# Patient Record
(demographics unavailable — no encounter records)

---

## 2025-03-10 NOTE — DISCUSSION/SUMMARY
[de-identified] :  Reviewed all X-ray images with patient, interpretation was provided. Discussed all treatment options with patient, would like to follow through with csi inj today Physical therapy prescribed for strengthening and stretching. Patient will follow up in 6 weeks.   RB&A to corticosteroid injection discussed. All questions were answered. Patient wishes to move forward with injection today.   Right Shoulder Subacromial steroid injection - ULTRASOUND GUIDED  Patient Identification Name/: Verbal with patient and/or family   Procedure Verification: Procedure confirmed with patient or family/designee Consent for procedure: Verbal Consent Given Relevant documentation completed, reviewed, and signed Clinical indications for procedure confirmed  Time-out with all members of procedure team immediately prior to procedure: Correct patient identified. Agreement on procedure. Correct side and site.  SHOULDER SUBACROMIAL INJECTION - RIGHT After verbal consent and identification of the correct patient and correct site, the RIGHT posterolateral shoulder was prepped using alcohol swabs and betadine. This was allowed time to air dry. A mixture of Kenalog, Bupivacaine was injected into the right shoulder subacromial space using a sterile 22G needle after ethyl chloride spray for skin anesthesia. The patient tolerated the procedure well.  A sterile dressing was placed.  After-care instructions were provided and included instructions to ice the area and to call if redness        ----------------------------------------------- Home Exercise The patient is instructed on a home exercise program.  RICHARD LIN Acting as a Scribe for Dr. Enrike ARIAS, Richard Lin, attest that this documentation has been prepared under the direction and in the presence of Provider Evgeny Calvert MD.  Activity Modification The patient was advised to modify their activities.  Dx / Natural History The patient was advised of the diagnosis.  The natural history of the pathology was explained in full to the patient in layman's terms.  Several different treatment options were discussed and explained in full to the patient including the risks and benefits of both surgical and non-surgical treatments.  All questions and concerns were answered.  Pain Guide Activities The patient was advised to let pain guide the gradual advancement of activities.  MEGA ARIAS explained to the patient that rest, ice, compression, and elevation would benefit them.  They may return to activity after follow-up or when they no longer have any pain.  The patient's current medication management of their orthopedic diagnosis was reviewed today: (1) We discussed a comprehensive treatment plan that included possible pharmaceutical management involving the use of prescription strength medications including but not limited to options such as oral Naprosyn 500mg BID, once daily Meloxicam 15 mg, or 500-650 mg Tylenol versus over the counter oral medications and topical prescription NSAID Pennsaid vs over the counter Voltaren gel. (2) There is a moderate risk of morbidity with further treatment, especially from use of prescription strength medications and possible side effects of these medications which include upset stomach with oral medications, skin reactions to topical medications and cardiac/renal issues with long term use. (3) I recommended that the patient follow-up with their medical physician to discuss any significant specific potential issues with long term medication use such as interactions with current medications or with exacerbation of underlying medical comorbidities. (4) The benefits and risks associated with use of injectable, oral or topical, prescription and over the counter anti-inflammatory medications were discussed with the patient. The patient voiced understanding of the risks including but not limited to bleeding, stroke, kidney dysfunction, heart disease, and were referred to the black box warning label for further information.

## 2025-03-10 NOTE — PHYSICAL EXAM
[de-identified] : Neurovascularly intact distally   Right Elbow: distal biceps tenderness  - hook test  no effusion NVI  Right Shoulder: +Impingement test +Neer test +Alexandra test +Wilmer sign 4-/5 Supraspinatus Strength

## 2025-03-10 NOTE — DATA REVIEWED
[FreeTextEntry1] : 03/07/25 [facility] X-ray right elbow 3 view: Unremarkable  03/07/25 [facility] X-Ray Examination of the RIGHT SHOULDER: 3+ views: Unremarkable

## 2025-03-10 NOTE — HISTORY OF PRESENT ILLNESS
[de-identified] : The patient is a 49 year old [RIGHT] hand dominant male who presents today complaining of RT bicep.   Date of Injury/Onset:  5/11/24 Pain:    At Rest: 5/10 in the morning  With Activity:  8/10  Mechanism of injury: pt was lifting a flower pot and states it dropped as he went to catch it he felt a sharp pain to the arm This is NOT a Work Related Injury being treated under Worker's Compensation. This is NOT an athletic injury occurring associated with an interscholastic or organized sports team. Quality of symptoms: aching, sharp, soreness  Improves with: Aleve  Worse with: across the body movements, reaching  Treatment/Imaging since last visit: PT  Out of work/sport: _, since _ School/Sport/Position/Occupation: [employer]  Changes since last visit: Patient reports around 11/24/24 he was trying to start a  and believes he pulled something in his right arm. Patient states certain movements trigger a sharp pain in different areas of his right upper arm.  Additional Information: None

## 2025-03-10 NOTE — PHYSICAL EXAM
[de-identified] : Neurovascularly intact distally   Right Elbow: distal biceps tenderness  - hook test  no effusion NVI  Right Shoulder: +Impingement test +Neer test +Alexandra test +Wilmer sign 4-/5 Supraspinatus Strength

## 2025-03-10 NOTE — HISTORY OF PRESENT ILLNESS
[de-identified] : The patient is a 49 year old [RIGHT] hand dominant male who presents today complaining of RT bicep.   Date of Injury/Onset:  5/11/24 Pain:    At Rest: 5/10 in the morning  With Activity:  8/10  Mechanism of injury: pt was lifting a flower pot and states it dropped as he went to catch it he felt a sharp pain to the arm This is NOT a Work Related Injury being treated under Worker's Compensation. This is NOT an athletic injury occurring associated with an interscholastic or organized sports team. Quality of symptoms: aching, sharp, soreness  Improves with: Aleve  Worse with: across the body movements, reaching  Treatment/Imaging since last visit: PT  Out of work/sport: _, since _ School/Sport/Position/Occupation: [employer]  Changes since last visit: Patient reports around 11/24/24 he was trying to start a  and believes he pulled something in his right arm. Patient states certain movements trigger a sharp pain in different areas of his right upper arm.  Additional Information: None

## 2025-03-10 NOTE — DISCUSSION/SUMMARY
[de-identified] :  Reviewed all X-ray images with patient, interpretation was provided. Discussed all treatment options with patient, would like to follow through with csi inj today Physical therapy prescribed for strengthening and stretching. Patient will follow up in 6 weeks.   RB&A to corticosteroid injection discussed. All questions were answered. Patient wishes to move forward with injection today.   Right Shoulder Subacromial steroid injection - ULTRASOUND GUIDED  Patient Identification Name/: Verbal with patient and/or family   Procedure Verification: Procedure confirmed with patient or family/designee Consent for procedure: Verbal Consent Given Relevant documentation completed, reviewed, and signed Clinical indications for procedure confirmed  Time-out with all members of procedure team immediately prior to procedure: Correct patient identified. Agreement on procedure. Correct side and site.  SHOULDER SUBACROMIAL INJECTION - RIGHT After verbal consent and identification of the correct patient and correct site, the RIGHT posterolateral shoulder was prepped using alcohol swabs and betadine. This was allowed time to air dry. A mixture of Kenalog, Bupivacaine was injected into the right shoulder subacromial space using a sterile 22G needle after ethyl chloride spray for skin anesthesia. The patient tolerated the procedure well.  A sterile dressing was placed.  After-care instructions were provided and included instructions to ice the area and to call if redness        ----------------------------------------------- Home Exercise The patient is instructed on a home exercise program.  RICHARD LIN Acting as a Scribe for Dr. Enrike ARIAS, Richard Lin, attest that this documentation has been prepared under the direction and in the presence of Provider Evgeny Calvert MD.  Activity Modification The patient was advised to modify their activities.  Dx / Natural History The patient was advised of the diagnosis.  The natural history of the pathology was explained in full to the patient in layman's terms.  Several different treatment options were discussed and explained in full to the patient including the risks and benefits of both surgical and non-surgical treatments.  All questions and concerns were answered.  Pain Guide Activities The patient was advised to let pain guide the gradual advancement of activities.  MEGA ARIAS explained to the patient that rest, ice, compression, and elevation would benefit them.  They may return to activity after follow-up or when they no longer have any pain.  The patient's current medication management of their orthopedic diagnosis was reviewed today: (1) We discussed a comprehensive treatment plan that included possible pharmaceutical management involving the use of prescription strength medications including but not limited to options such as oral Naprosyn 500mg BID, once daily Meloxicam 15 mg, or 500-650 mg Tylenol versus over the counter oral medications and topical prescription NSAID Pennsaid vs over the counter Voltaren gel. (2) There is a moderate risk of morbidity with further treatment, especially from use of prescription strength medications and possible side effects of these medications which include upset stomach with oral medications, skin reactions to topical medications and cardiac/renal issues with long term use. (3) I recommended that the patient follow-up with their medical physician to discuss any significant specific potential issues with long term medication use such as interactions with current medications or with exacerbation of underlying medical comorbidities. (4) The benefits and risks associated with use of injectable, oral or topical, prescription and over the counter anti-inflammatory medications were discussed with the patient. The patient voiced understanding of the risks including but not limited to bleeding, stroke, kidney dysfunction, heart disease, and were referred to the black box warning label for further information.

## 2025-04-15 NOTE — PHYSICAL EXAM
[de-identified] : Neurovascularly intact distally   Right Elbow: nontender full rom - hook test  no effusion NVI  Right Shoulder: +Impingement test +Neer test +Alexandra test +Wilmer sign 4-/5 Supraspinatus Strength

## 2025-04-15 NOTE — DISCUSSION/SUMMARY
[de-identified] : Patient was given prescription of formal physical therapy for strengthening and stretching. Rx Naprosyn sent to patient pharmacy. Patient will follow up in 6 weeks.     ----------------------------------------------- Home Exercise The patient is instructed on a home exercise program.   ERNESTINA BALES Acting as a Scribe for Dr. Enrike ARIAS, Ernestina Bales, attest that this documentation has been prepared under the direction and in the presence of Provider Dr. Calvert.   Activity Modification The patient was advised to modify their activities.   Dx / Natural History The patient was advised of the diagnosis. The natural history of the pathology was explained in full to the patient in layman's terms. Several different treatment options were discussed and explained in full to the patient including the risks and benefits of both surgical and non-surgical treatments.  All questions and concerns were answered.   Pain Guide Activities The patient was advised to let pain guide the gradual advancement of activities.   RICE I explained to the patient that rest, ice, compression, and elevation would benefit them. They may return to activity after follow-up or when they no longer have any pain.   The patient's current medication management of their orthopedic diagnosis was reviewed today: (1) We discussed a comprehensive treatment plan that included possible pharmaceutical management involving the use of prescription strength medications including but not limited to options such as oral Naprosyn 500mg BID, once daily Meloxicam 15 mg, or 500-650 mg Tylenol versus over the counter oral medications and topical prescription NSAID Pennsaid vs over the counter Voltaren gel. (2) There is a moderate risk of morbidity with further treatment, especially from use of prescription strength medications and possible side effects of these medications which include upset stomach with oral medications, skin reactions to topical medications and cardiac/renal issues with long term use. (3) I recommended that the patient follow-up with their medical physician to discuss any significant specific potential issues with long term medication use such as interactions with current medications or with exacerbation of underlying medical comorbidities. (4) The benefits and risks associated with use of injectable, oral or topical, prescription and over the counter anti-inflammatory medications were discussed with the patient. The patient voiced understanding of the risks including but not limited to bleeding, stroke, kidney dysfunction, heart disease, and were referred to the black box warning label for further information.

## 2025-04-15 NOTE — HISTORY OF PRESENT ILLNESS
[de-identified] : The patient is a 49 year old [RIGHT] hand dominant male who presents today complaining of RT bicep.   Date of Injury/Onset:  5/11/24 Pain:    At Rest: 2/10 With Activity:  5/10  Mechanism of injury: pt was lifting a flower pot and states it dropped as he went to catch it he felt a sharp pain to the arm This is NOT a Work Related Injury being treated under Worker's Compensation. This is NOT an athletic injury occurring associated with an interscholastic or organized sports team. Quality of symptoms: aching, sharp, soreness  Improves with: Aleve  Worse with: across the body movements, reaching  Treatment/Imaging since last visit: CSI RT SHOULDER  3/7/25 and PT for RT elbow @ [facility]x per week w/ HEP Out of work/sport: currently working  School/Sport/Position/Occupation: [employer]  Changes since last visit:  Patient reports dec in elbow pain since beginning PT. Reports mild dec in pain of RT shoulder but reports dull constant ache that is aggravated by overhead or reaching backward activity. May benefit from PT of the shoulder Additional Information: None

## 2025-04-15 NOTE — ADDENDUM
[FreeTextEntry1] : Documented by Kofi Mondragon acting as a scribe for Dr. Calvert and Zeferino Bazzi PA-C on 04/15/2025 and was presence for the following sections: Physical Exam; Data Reviewed; Assessment; Discussion/Summary. All medical record entries made by the Scribe were at my, Dr. Calvert, and Zeferino Bazzi, direction and personally dictated by me on 04/15/2025. I have reviewed the chart and agree that the record accurately reflects my personal performance of the history, physical exam, procedure and imaging.

## 2025-06-01 NOTE — DISCUSSION/SUMMARY
[de-identified] : Patient is still experiencing pain during today's visit. ZP MRI of the right shoulder to eval for RCT Physical therapy prescribed for strengthening and stretching. Follow up after scan.     **GIRD   RB&A to corticosteroid injection discussed. All questions were answered. Patient wishes to move forward with injection today.   Right Shoulder Subacromial steroid injection - ULTRASOUND GUIDED  Patient Identification Name/: Verbal with patient and/or family   Procedure Verification: Procedure confirmed with patient or family/designee Consent for procedure: Verbal Consent Given Relevant documentation completed, reviewed, and signed Clinical indications for procedure confirmed  Time-out with all members of procedure team immediately prior to procedure: Correct patient identified. Agreement on procedure. Correct side and site.  SHOULDER SUBACROMIAL INJECTION - RIGHT After verbal consent and identification of the correct patient and correct site, the RIGHT posterolateral shoulder was prepped using alcohol swabs and betadine. This was allowed time to air dry. A mixture of Kenalog, Bupivacaine was injected into the right shoulder subacromial space using a sterile 22G needle after ethyl chloride spray for skin anesthesia. The patient tolerated the procedure well.  A sterile dressing was placed.  After-care instructions were provided and included instructions to ice the area and to call if redness  ----------------------------------------------- Home Exercise The patient is instructed on a home exercise program.  RICHARD LIN Acting as a Scribe for Dr. Enrike ARIAS, Richard Lin, attest that this documentation has been prepared under the direction and in the presence of Provider Evgeny Calvert MD.  Activity Modification The patient was advised to modify their activities.  Dx / Natural History The patient was advised of the diagnosis.  The natural history of the pathology was explained in full to the patient in layman's terms.  Several different treatment options were discussed and explained in full to the patient including the risks and benefits of both surgical and non-surgical treatments.  All questions and concerns were answered.  Pain Guide Activities The patient was advised to let pain guide the gradual advancement of activities.  MEGA ARIAS explained to the patient that rest, ice, compression, and elevation would benefit them.  They may return to activity after follow-up or when they no longer have any pain.  The patient's current medication management of their orthopedic diagnosis was reviewed today: (1) We discussed a comprehensive treatment plan that included possible pharmaceutical management involving the use of prescription strength medications including but not limited to options such as oral Naprosyn 500mg BID, once daily Meloxicam 15 mg, or 500-650 mg Tylenol versus over the counter oral medications and topical prescription NSAID Pennsaid vs over the counter Voltaren gel. (2) There is a moderate risk of morbidity with further treatment, especially from use of prescription strength medications and possible side effects of these medications which include upset stomach with oral medications, skin reactions to topical medications and cardiac/renal issues with long term use. (3) I recommended that the patient follow-up with their medical physician to discuss any significant specific potential issues with long term medication use such as interactions with current medications or with exacerbation of underlying medical comorbidities. (4) The benefits and risks associated with use of injectable, oral or topical, prescription and over the counter anti-inflammatory medications were discussed with the patient. The patient voiced understanding of the risks including but not limited to bleeding, stroke, kidney dysfunction, heart disease, and were referred to the black box warning label for further information.

## 2025-06-01 NOTE — PHYSICAL EXAM
[de-identified] : Neurovascularly intact distally   Right Elbow: nontender full rom - hook test  no effusion NVI  Right Shoulder: +Impingement test +Neer test +Alexandra test +Wilmer sign 4-/5 Supraspinatus Strength scapular stabilized internal rotation: 15 degrees

## 2025-06-01 NOTE — HISTORY OF PRESENT ILLNESS
[de-identified] : The patient is a 49 year old [RIGHT] hand dominant male who presents today complaining of RT bicep.   Date of Injury/Onset:  5/11/24 Pain:    At Rest: 2/10 With Activity:  5/10  Mechanism of injury: pt was lifting a flower pot and states it dropped as he went to catch it he felt a sharp pain to the arm This is NOT a Work Related Injury being treated under Worker's Compensation. This is NOT an athletic injury occurring associated with an interscholastic or organized sports team. Quality of symptoms: aching, sharp, soreness  Improves with: Aleve  Worse with: across the body movements, reaching  Treatment/Imaging since last visit: CSI RT SHOULDER  3/7/25 and PT @ [facility] per week w/ HEP Out of work/sport: currently working  School/Sport/Position/Occupation: [employer]  Changes since last visit:  Patient reports continued pain with RT shoulder. Reports onset of pain after inc activity that lasts for 2-3 days until it resolves. Additional Information: None

## 2025-06-01 NOTE — HISTORY OF PRESENT ILLNESS
[de-identified] : The patient is a 49 year old [RIGHT] hand dominant male who presents today complaining of RT bicep.   Date of Injury/Onset:  5/11/24 Pain:    At Rest: 2/10 With Activity:  5/10  Mechanism of injury: pt was lifting a flower pot and states it dropped as he went to catch it he felt a sharp pain to the arm This is NOT a Work Related Injury being treated under Worker's Compensation. This is NOT an athletic injury occurring associated with an interscholastic or organized sports team. Quality of symptoms: aching, sharp, soreness  Improves with: Aleve  Worse with: across the body movements, reaching  Treatment/Imaging since last visit: CSI RT SHOULDER  3/7/25 and PT @ [facility] per week w/ HEP Out of work/sport: currently working  School/Sport/Position/Occupation: [employer]  Changes since last visit:  Patient reports continued pain with RT shoulder. Reports onset of pain after inc activity that lasts for 2-3 days until it resolves. Additional Information: None

## 2025-06-01 NOTE — PHYSICAL EXAM
[de-identified] : Neurovascularly intact distally   Right Elbow: nontender full rom - hook test  no effusion NVI  Right Shoulder: +Impingement test +Neer test +Alexandra test +Wilmer sign 4-/5 Supraspinatus Strength scapular stabilized internal rotation: 15 degrees

## 2025-06-01 NOTE — DISCUSSION/SUMMARY
[de-identified] : Patient is still experiencing pain during today's visit. ZP MRI of the right shoulder to eval for RCT Physical therapy prescribed for strengthening and stretching. Follow up after scan.     **GIRD   RB&A to corticosteroid injection discussed. All questions were answered. Patient wishes to move forward with injection today.   Right Shoulder Subacromial steroid injection - ULTRASOUND GUIDED  Patient Identification Name/: Verbal with patient and/or family   Procedure Verification: Procedure confirmed with patient or family/designee Consent for procedure: Verbal Consent Given Relevant documentation completed, reviewed, and signed Clinical indications for procedure confirmed  Time-out with all members of procedure team immediately prior to procedure: Correct patient identified. Agreement on procedure. Correct side and site.  SHOULDER SUBACROMIAL INJECTION - RIGHT After verbal consent and identification of the correct patient and correct site, the RIGHT posterolateral shoulder was prepped using alcohol swabs and betadine. This was allowed time to air dry. A mixture of Kenalog, Bupivacaine was injected into the right shoulder subacromial space using a sterile 22G needle after ethyl chloride spray for skin anesthesia. The patient tolerated the procedure well.  A sterile dressing was placed.  After-care instructions were provided and included instructions to ice the area and to call if redness  ----------------------------------------------- Home Exercise The patient is instructed on a home exercise program.  RICHARD LIN Acting as a Scribe for Dr. Enrike ARIAS, Richard Lin, attest that this documentation has been prepared under the direction and in the presence of Provider Evgeny Calvert MD.  Activity Modification The patient was advised to modify their activities.  Dx / Natural History The patient was advised of the diagnosis.  The natural history of the pathology was explained in full to the patient in layman's terms.  Several different treatment options were discussed and explained in full to the patient including the risks and benefits of both surgical and non-surgical treatments.  All questions and concerns were answered.  Pain Guide Activities The patient was advised to let pain guide the gradual advancement of activities.  MEGA ARIAS explained to the patient that rest, ice, compression, and elevation would benefit them.  They may return to activity after follow-up or when they no longer have any pain.  The patient's current medication management of their orthopedic diagnosis was reviewed today: (1) We discussed a comprehensive treatment plan that included possible pharmaceutical management involving the use of prescription strength medications including but not limited to options such as oral Naprosyn 500mg BID, once daily Meloxicam 15 mg, or 500-650 mg Tylenol versus over the counter oral medications and topical prescription NSAID Pennsaid vs over the counter Voltaren gel. (2) There is a moderate risk of morbidity with further treatment, especially from use of prescription strength medications and possible side effects of these medications which include upset stomach with oral medications, skin reactions to topical medications and cardiac/renal issues with long term use. (3) I recommended that the patient follow-up with their medical physician to discuss any significant specific potential issues with long term medication use such as interactions with current medications or with exacerbation of underlying medical comorbidities. (4) The benefits and risks associated with use of injectable, oral or topical, prescription and over the counter anti-inflammatory medications were discussed with the patient. The patient voiced understanding of the risks including but not limited to bleeding, stroke, kidney dysfunction, heart disease, and were referred to the black box warning label for further information.

## 2025-06-01 NOTE — PHYSICAL EXAM
[de-identified] : Neurovascularly intact distally   Right Elbow: nontender full rom - hook test  no effusion NVI  Right Shoulder: +Impingement test +Neer test +Alexandra test +Wilmer sign 4-/5 Supraspinatus Strength scapular stabilized internal rotation: 15 degrees

## 2025-06-01 NOTE — DISCUSSION/SUMMARY
[de-identified] : Patient is still experiencing pain during today's visit. ZP MRI of the right shoulder to eval for RCT Physical therapy prescribed for strengthening and stretching. Follow up after scan.     **GIRD   RB&A to corticosteroid injection discussed. All questions were answered. Patient wishes to move forward with injection today.   Right Shoulder Subacromial steroid injection - ULTRASOUND GUIDED  Patient Identification Name/: Verbal with patient and/or family   Procedure Verification: Procedure confirmed with patient or family/designee Consent for procedure: Verbal Consent Given Relevant documentation completed, reviewed, and signed Clinical indications for procedure confirmed  Time-out with all members of procedure team immediately prior to procedure: Correct patient identified. Agreement on procedure. Correct side and site.  SHOULDER SUBACROMIAL INJECTION - RIGHT After verbal consent and identification of the correct patient and correct site, the RIGHT posterolateral shoulder was prepped using alcohol swabs and betadine. This was allowed time to air dry. A mixture of Kenalog, Bupivacaine was injected into the right shoulder subacromial space using a sterile 22G needle after ethyl chloride spray for skin anesthesia. The patient tolerated the procedure well.  A sterile dressing was placed.  After-care instructions were provided and included instructions to ice the area and to call if redness  ----------------------------------------------- Home Exercise The patient is instructed on a home exercise program.  RICHARD LIN Acting as a Scribe for Dr. Enrike ARIAS, Richard Lin, attest that this documentation has been prepared under the direction and in the presence of Provider Evgeny Calvert MD.  Activity Modification The patient was advised to modify their activities.  Dx / Natural History The patient was advised of the diagnosis.  The natural history of the pathology was explained in full to the patient in layman's terms.  Several different treatment options were discussed and explained in full to the patient including the risks and benefits of both surgical and non-surgical treatments.  All questions and concerns were answered.  Pain Guide Activities The patient was advised to let pain guide the gradual advancement of activities.  MEGA ARIAS explained to the patient that rest, ice, compression, and elevation would benefit them.  They may return to activity after follow-up or when they no longer have any pain.  The patient's current medication management of their orthopedic diagnosis was reviewed today: (1) We discussed a comprehensive treatment plan that included possible pharmaceutical management involving the use of prescription strength medications including but not limited to options such as oral Naprosyn 500mg BID, once daily Meloxicam 15 mg, or 500-650 mg Tylenol versus over the counter oral medications and topical prescription NSAID Pennsaid vs over the counter Voltaren gel. (2) There is a moderate risk of morbidity with further treatment, especially from use of prescription strength medications and possible side effects of these medications which include upset stomach with oral medications, skin reactions to topical medications and cardiac/renal issues with long term use. (3) I recommended that the patient follow-up with their medical physician to discuss any significant specific potential issues with long term medication use such as interactions with current medications or with exacerbation of underlying medical comorbidities. (4) The benefits and risks associated with use of injectable, oral or topical, prescription and over the counter anti-inflammatory medications were discussed with the patient. The patient voiced understanding of the risks including but not limited to bleeding, stroke, kidney dysfunction, heart disease, and were referred to the black box warning label for further information.

## 2025-06-01 NOTE — HISTORY OF PRESENT ILLNESS
[de-identified] : The patient is a 49 year old [RIGHT] hand dominant male who presents today complaining of RT bicep.   Date of Injury/Onset:  5/11/24 Pain:    At Rest: 2/10 With Activity:  5/10  Mechanism of injury: pt was lifting a flower pot and states it dropped as he went to catch it he felt a sharp pain to the arm This is NOT a Work Related Injury being treated under Worker's Compensation. This is NOT an athletic injury occurring associated with an interscholastic or organized sports team. Quality of symptoms: aching, sharp, soreness  Improves with: Aleve  Worse with: across the body movements, reaching  Treatment/Imaging since last visit: CSI RT SHOULDER  3/7/25 and PT @ [facility] per week w/ HEP Out of work/sport: currently working  School/Sport/Position/Occupation: [employer]  Changes since last visit:  Patient reports continued pain with RT shoulder. Reports onset of pain after inc activity that lasts for 2-3 days until it resolves. Additional Information: None